# Patient Record
Sex: FEMALE | Race: BLACK OR AFRICAN AMERICAN | URBAN - METROPOLITAN AREA
[De-identification: names, ages, dates, MRNs, and addresses within clinical notes are randomized per-mention and may not be internally consistent; named-entity substitution may affect disease eponyms.]

---

## 2017-06-03 ENCOUNTER — EMERGENCY (EMERGENCY)
Facility: HOSPITAL | Age: 52
LOS: 1 days | Discharge: HOME | End: 2017-06-03
Attending: EMERGENCY MEDICINE | Admitting: EMERGENCY MEDICINE
Payer: SELF-PAY

## 2017-06-03 VITALS
HEIGHT: 65 IN | WEIGHT: 154.98 LBS | RESPIRATION RATE: 18 BRPM | OXYGEN SATURATION: 100 % | HEART RATE: 100 BPM | TEMPERATURE: 98 F

## 2017-06-03 VITALS
DIASTOLIC BLOOD PRESSURE: 90 MMHG | HEART RATE: 75 BPM | SYSTOLIC BLOOD PRESSURE: 142 MMHG | OXYGEN SATURATION: 98 % | RESPIRATION RATE: 16 BRPM

## 2017-06-03 DIAGNOSIS — R51 HEADACHE: ICD-10-CM

## 2017-06-03 PROCEDURE — 99283 EMERGENCY DEPT VISIT LOW MDM: CPT

## 2017-06-03 NOTE — ED ADULT NURSE NOTE - CHPI ED SYMPTOMS NEG
no abrasion/no change in level of consciousness/no chest pain/no chest wall tenderness/no seizure/no back pain/no vomiting/no weakness/no loss of consciousness
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
## 2021-06-02 NOTE — ED ADULT TRIAGE NOTE - TEMPERATURE IN CELSIUS (DEGREES C)
From [facility].  91F worse hypoxemia, from 1L now on 4L.  LLL infiltrate.  Pt is DNR (code status confirmed today).     Ney Gardiner MD  06/02/21 1149     36.7

## 2023-03-01 NOTE — ED PROVIDER NOTE - PSYCHIATRIC, MLM
Render In Strict Bullet Format?: No Alert and oriented to person, place, time/situation. normal mood and affect. no apparent risk to self or others. Initiate Treatment: doxycycline 100mg po bid x 3 days Detail Level: Zone

## 2024-04-14 NOTE — ED ADULT NURSE NOTE - RESPIRATORY WDL
Pt presented to ED as a walk in s/p fall. Per Pt she was walking her dog and the dog pulled her, she tripped and fell over her neighbors concrete steps.   Pt denies LOC. Pt denies taking any blood thinners.   Pt c/p right wrist pain, pain to right ribs and pain to chin.   Pt has ice to right wrist. Wrist is swollen with small deformity and abrasion noted. Pt also has abrasion to right knee and chin   Breathing spontaneous and unlabored. Breath sounds clear and equal bilaterally with regular rhythm.

## 2024-12-20 NOTE — ED ADULT NURSE NOTE - PAIN: PRESENCE, MLM
room due to pain.  Ultrasound of the gallbladder was taken and revealed a dilated CBD of 1.4 mm.  Sonographer also noted a positive Aceves sign.  Patient was given morphine and fentanyl all with significant pain relief however it quickly returned    Seen by GI and GS, s/p abnormal HIDA, MRCP with CBD dilation without stone, as per GI patient was transferred to .       Exam:   Vitals:  Vitals:    12/18/24 1304 12/18/24 1506 12/18/24 1953 12/19/24 0327   BP: 128/77 133/89 111/73 122/82   Pulse: 81 83 80 78   Resp: 16 16 16 16   Temp: 98 °F (36.7 °C) 98.6 °F (37 °C) 97.9 °F (36.6 °C) 98.4 °F (36.9 °C)   TempSrc: Oral Oral Oral Oral   SpO2: 98% 100% 98% 99%   Weight:       Height:         Weight: Weight - Scale: 93.1 kg (205 lb 4 oz)     General appearance: No apparent distress, well developed, appears stated age.  Eyes:  Pupils equal, round, and reactive to light. Conjunctivae/corneas clear.  HENT: Head normal in appearance. External nares normal.  Oral mucosa moist without lesions.  Hearing grossly intact.   Neck: Supple, with full range of motion. Trachea midline.  No gross JVD appreciated.  Respiratory:  Normal respiratory effort. Clear to auscultation, bilaterally without rales or wheezes or rhonchi.  Cardiovascular: Normal rate, regular rhythm with normal S1/S2 without murmurs.  No lower extremity edema.   Abdomen: Soft, non-tender, non-distended with normal bowel sounds.  Musculoskeletal: There is no joint swelling or tenderness. Normal tone. No abnormal movements.   Skin: Warm and dry. No rashes or lesions.  Neurologic:  No focal sensory/motor deficits in the upper and lower extremities. Cranial nerves:  grossly non-focal 2-12.     Psychiatric: Alert and oriented, normal insight and thought content.   Capillary Refill: Brisk,< 3 seconds.  Peripheral Pulses: +2 palpable, equal bilaterally.       Significant Diagnostic Studies    Labs: For convenience and continuity at follow-up the following most recent labs  complains of pain/discomfort